# Patient Record
Sex: MALE | Race: WHITE | URBAN - METROPOLITAN AREA
[De-identification: names, ages, dates, MRNs, and addresses within clinical notes are randomized per-mention and may not be internally consistent; named-entity substitution may affect disease eponyms.]

---

## 2018-04-12 ENCOUNTER — EMERGENCY (EMERGENCY)
Facility: HOSPITAL | Age: 53
LOS: 1 days | Discharge: HOME | End: 2018-04-12
Admitting: EMERGENCY MEDICINE
Payer: COMMERCIAL

## 2018-04-12 VITALS
RESPIRATION RATE: 20 BRPM | DIASTOLIC BLOOD PRESSURE: 58 MMHG | HEART RATE: 104 BPM | OXYGEN SATURATION: 96 % | WEIGHT: 240.08 LBS | TEMPERATURE: 100 F | SYSTOLIC BLOOD PRESSURE: 151 MMHG

## 2018-04-12 VITALS
HEART RATE: 98 BPM | SYSTOLIC BLOOD PRESSURE: 156 MMHG | OXYGEN SATURATION: 96 % | DIASTOLIC BLOOD PRESSURE: 99 MMHG | TEMPERATURE: 100 F | RESPIRATION RATE: 20 BRPM
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
## 2018-04-12 DIAGNOSIS — S62.614A DISPLACED FRACTURE OF PROXIMAL PHALANX OF RIGHT RING FINGER, INITIAL ENCOUNTER FOR CLOSED FRACTURE: ICD-10-CM

## 2018-04-12 DIAGNOSIS — M79.601 PAIN IN RIGHT ARM: ICD-10-CM
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
## 2018-04-12 DIAGNOSIS — S42.201A UNSPECIFIED FRACTURE OF UPPER END OF RIGHT HUMERUS, INITIAL ENCOUNTER FOR CLOSED FRACTURE: ICD-10-CM

## 2018-04-12 PROCEDURE — 73130 X-RAY EXAM OF HAND: CPT | Mod: 26,RT

## 2018-04-12 PROCEDURE — 73060 X-RAY EXAM OF HUMERUS: CPT

## 2018-04-12 PROCEDURE — 99284 EMERGENCY DEPT VISIT MOD MDM: CPT

## 2018-04-12 PROCEDURE — 73130 X-RAY EXAM OF HAND: CPT

## 2018-04-12 PROCEDURE — 23600 CLTX PROX HUMRL FX W/O MNPJ: CPT

## 2018-04-12 PROCEDURE — 99284 EMERGENCY DEPT VISIT MOD MDM: CPT | Mod: 25

## 2018-04-12 PROCEDURE — 73030 X-RAY EXAM OF SHOULDER: CPT

## 2018-04-12 PROCEDURE — 73060 X-RAY EXAM OF HUMERUS: CPT | Mod: 26,RT

## 2018-04-12 PROCEDURE — 26720 TREAT FINGER FRACTURE EACH: CPT

## 2018-04-12 PROCEDURE — 73030 X-RAY EXAM OF SHOULDER: CPT | Mod: 26,RT

## 2018-04-12 RX ORDER — ACETAMINOPHEN 500 MG
650 TABLET ORAL ONCE
Qty: 0 | Refills: 0 | Status: COMPLETED | OUTPATIENT
Start: 2018-04-12 | End: 2018-04-12

## 2018-04-12 RX ADMIN — Medication 650 MILLIGRAM(S): at 13:59

## 2018-04-12 RX ADMIN — Medication 650 MILLIGRAM(S): at 14:14
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
## 2018-04-12 NOTE — ED PROVIDER NOTE - LOCATION
arm/shoulder/finger/Right shoulder - LROM with no ttp. TTP located on medial arm. Motor function and sensation intact with 2+ pulses.
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
## 2018-04-12 NOTE — ED PROVIDER NOTE - MEDICAL DECISION MAKING DETAILS
52 y/o male with right mid arm pain with LROM. Unable hold arm above head. Will obtain xray. Pain is minimal - treated with tylenol. Sensation and motor function of the right arm is intact with nml reflex. 52 y/o male with right mid arm pain with LROM. Unable hold arm above head. Will obtain xray. Pain is minimal - treated with tylenol. Sensation and motor function of the right arm is intact with nml reflex. Xray with proximal humeral head fracture. Placed in sling with ortho consult. Pt also has a fx of the distal right 4th phalanx. Placed in finger splint. 52 y/o male with right mid arm pain with LROM. Unable hold arm above head. Will obtain xray. Pain is minimal - treated with tylenol. Sensation and motor function of the right arm is intact with nml reflex. Xray with proximal humeral head fracture. Placed in sling with ortho consult. Pt also has a fx of the distal right 4th phalanx. Placed in finger splint. Pt will fu with Dr. Doyle and Dr. Escamilla in 1 week.
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
## 2018-04-12 NOTE — ED PROVIDER NOTE - CARE PLAN
Principal Discharge DX:	Arm pain Principal Discharge DX:	Humeral head fracture  Secondary Diagnosis:	Proximal phalanx fracture of finger

## 2018-04-12 NOTE — ED PROVIDER NOTE - PROGRESS NOTE DETAILS
Pt placed in shoulder immobilizer. Sitting comfortably with mild pain. Ortho consulted pending further dispo.

## 2018-04-13 PROBLEM — Z00.00 ENCOUNTER FOR PREVENTIVE HEALTH EXAMINATION: Status: ACTIVE | Noted: 2018-04-13

## 2018-04-16 ENCOUNTER — APPOINTMENT (OUTPATIENT)
Dept: ORTHOPEDIC SURGERY | Facility: CLINIC | Age: 53
End: 2018-04-16
Payer: COMMERCIAL

## 2018-04-16 VITALS — RESPIRATION RATE: 16 BRPM | BODY MASS INDEX: 32.51 KG/M2 | WEIGHT: 240 LBS | HEIGHT: 72 IN

## 2018-04-16 DIAGNOSIS — Z78.9 OTHER SPECIFIED HEALTH STATUS: ICD-10-CM

## 2018-04-16 PROCEDURE — 99203 OFFICE O/P NEW LOW 30 MIN: CPT

## 2018-04-16 PROCEDURE — 73140 X-RAY EXAM OF FINGER(S): CPT | Mod: F8

## 2018-04-24 ENCOUNTER — APPOINTMENT (OUTPATIENT)
Dept: ORTHOPEDIC SURGERY | Facility: CLINIC | Age: 53
End: 2018-04-24
Payer: COMMERCIAL

## 2018-04-24 VITALS — WEIGHT: 240 LBS | BODY MASS INDEX: 32.51 KG/M2 | RESPIRATION RATE: 16 BRPM | HEIGHT: 72 IN

## 2018-04-24 DIAGNOSIS — Z82.61 FAMILY HISTORY OF ARTHRITIS: ICD-10-CM

## 2018-04-24 DIAGNOSIS — Z80.3 FAMILY HISTORY OF MALIGNANT NEOPLASM OF BREAST: ICD-10-CM

## 2018-04-24 PROCEDURE — 99214 OFFICE O/P EST MOD 30 MIN: CPT

## 2018-04-27 ENCOUNTER — OUTPATIENT (OUTPATIENT)
Dept: OUTPATIENT SERVICES | Facility: HOSPITAL | Age: 53
LOS: 1 days | End: 2018-04-27

## 2018-04-27 ENCOUNTER — APPOINTMENT (OUTPATIENT)
Dept: CT IMAGING | Facility: CLINIC | Age: 53
End: 2018-04-27
Payer: COMMERCIAL

## 2018-04-27 PROCEDURE — 73200 CT UPPER EXTREMITY W/O DYE: CPT | Mod: 26,RT

## 2018-05-01 ENCOUNTER — APPOINTMENT (OUTPATIENT)
Dept: ORTHOPEDIC SURGERY | Facility: CLINIC | Age: 53
End: 2018-05-01
Payer: COMMERCIAL

## 2018-05-01 VITALS — HEIGHT: 72 IN | BODY MASS INDEX: 32.51 KG/M2 | WEIGHT: 240 LBS

## 2018-05-01 PROCEDURE — 99214 OFFICE O/P EST MOD 30 MIN: CPT

## 2018-05-01 RX ORDER — ASPIRIN 325 MG/1
TABLET, FILM COATED ORAL
Refills: 0 | Status: DISCONTINUED | COMMUNITY
End: 2018-05-01

## 2018-05-14 ENCOUNTER — APPOINTMENT (OUTPATIENT)
Dept: ORTHOPEDIC SURGERY | Facility: CLINIC | Age: 53
End: 2018-05-14
Payer: COMMERCIAL

## 2018-05-14 PROCEDURE — 73140 X-RAY EXAM OF FINGER(S): CPT | Mod: F8

## 2018-05-14 PROCEDURE — 99214 OFFICE O/P EST MOD 30 MIN: CPT

## 2018-05-17 ENCOUNTER — FORM ENCOUNTER (OUTPATIENT)
Age: 53
End: 2018-05-17

## 2018-05-18 ENCOUNTER — OUTPATIENT (OUTPATIENT)
Dept: OUTPATIENT SERVICES | Facility: HOSPITAL | Age: 53
LOS: 1 days | End: 2018-05-18
Payer: COMMERCIAL

## 2018-05-18 ENCOUNTER — APPOINTMENT (OUTPATIENT)
Dept: RADIOLOGY | Facility: CLINIC | Age: 53
End: 2018-05-18

## 2018-05-18 ENCOUNTER — APPOINTMENT (OUTPATIENT)
Dept: ORTHOPEDIC SURGERY | Facility: CLINIC | Age: 53
End: 2018-05-18
Payer: COMMERCIAL

## 2018-05-18 VITALS — WEIGHT: 240 LBS | BODY MASS INDEX: 32.51 KG/M2 | HEIGHT: 72 IN

## 2018-05-18 PROCEDURE — 99212 OFFICE O/P EST SF 10 MIN: CPT

## 2018-05-18 PROCEDURE — 73030 X-RAY EXAM OF SHOULDER: CPT

## 2018-05-18 PROCEDURE — 73030 X-RAY EXAM OF SHOULDER: CPT | Mod: 26,RT

## 2018-06-28 ENCOUNTER — FORM ENCOUNTER (OUTPATIENT)
Age: 53
End: 2018-06-28

## 2018-06-29 ENCOUNTER — APPOINTMENT (OUTPATIENT)
Dept: ORTHOPEDIC SURGERY | Facility: CLINIC | Age: 53
End: 2018-06-29
Payer: COMMERCIAL

## 2018-06-29 ENCOUNTER — APPOINTMENT (OUTPATIENT)
Dept: RADIOLOGY | Facility: CLINIC | Age: 53
End: 2018-06-29

## 2018-06-29 ENCOUNTER — OUTPATIENT (OUTPATIENT)
Dept: OUTPATIENT SERVICES | Facility: HOSPITAL | Age: 53
LOS: 1 days | End: 2018-06-29
Payer: COMMERCIAL

## 2018-06-29 VITALS — HEIGHT: 72 IN | BODY MASS INDEX: 32.51 KG/M2 | WEIGHT: 240 LBS

## 2018-06-29 DIAGNOSIS — S42.201A UNSPECIFIED FRACTURE OF UPPER END OF RIGHT HUMERUS, INITIAL ENCOUNTER FOR CLOSED FRACTURE: ICD-10-CM

## 2018-06-29 DIAGNOSIS — S62.664D NONDISPLACED FRACTURE OF DISTAL PHALANX OF RIGHT RING FINGER, SUBSEQUENT ENCOUNTER FOR FRACTURE WITH ROUTINE HEALING: ICD-10-CM

## 2018-06-29 PROCEDURE — 73030 X-RAY EXAM OF SHOULDER: CPT

## 2018-06-29 PROCEDURE — 73030 X-RAY EXAM OF SHOULDER: CPT | Mod: 26,RT

## 2018-06-29 PROCEDURE — 99213 OFFICE O/P EST LOW 20 MIN: CPT

## 2018-09-07 ENCOUNTER — APPOINTMENT (OUTPATIENT)
Dept: ORTHOPEDIC SURGERY | Facility: CLINIC | Age: 53
End: 2018-09-07

## 2020-09-02 NOTE — ED ADULT NURSE NOTE - [id number]_ED_A_ED
Eliquis medication approved from 8/21/2020-9/2/2021  See      Faxed to [facility] [phone]   No